# Patient Record
Sex: MALE | Race: BLACK OR AFRICAN AMERICAN | NOT HISPANIC OR LATINO | URBAN - METROPOLITAN AREA
[De-identification: names, ages, dates, MRNs, and addresses within clinical notes are randomized per-mention and may not be internally consistent; named-entity substitution may affect disease eponyms.]

---

## 2019-08-11 ENCOUNTER — EMERGENCY (EMERGENCY)
Facility: HOSPITAL | Age: 59
LOS: 1 days | Discharge: HOME | End: 2019-08-11
Attending: EMERGENCY MEDICINE | Admitting: EMERGENCY MEDICINE
Payer: COMMERCIAL

## 2019-08-11 VITALS
OXYGEN SATURATION: 100 % | DIASTOLIC BLOOD PRESSURE: 97 MMHG | SYSTOLIC BLOOD PRESSURE: 144 MMHG | TEMPERATURE: 98 F | HEART RATE: 74 BPM | RESPIRATION RATE: 16 BRPM

## 2019-08-11 VITALS
TEMPERATURE: 99 F | OXYGEN SATURATION: 100 % | HEART RATE: 66 BPM | RESPIRATION RATE: 16 BRPM | SYSTOLIC BLOOD PRESSURE: 162 MMHG | DIASTOLIC BLOOD PRESSURE: 93 MMHG

## 2019-08-11 PROCEDURE — 70486 CT MAXILLOFACIAL W/O DYE: CPT | Mod: 26

## 2019-08-11 PROCEDURE — 99284 EMERGENCY DEPT VISIT MOD MDM: CPT

## 2019-08-11 PROCEDURE — 72125 CT NECK SPINE W/O DYE: CPT | Mod: 26

## 2019-08-11 PROCEDURE — 70450 CT HEAD/BRAIN W/O DYE: CPT | Mod: 26

## 2019-08-11 RX ORDER — ACETAMINOPHEN 500 MG
650 TABLET ORAL ONCE
Refills: 0 | Status: COMPLETED | OUTPATIENT
Start: 2019-08-11 | End: 2019-08-11

## 2019-08-11 RX ADMIN — Medication 650 MILLIGRAM(S): at 07:22

## 2019-08-11 NOTE — ED PROVIDER NOTE - NS ED ROS FT
GENERAL: No fever or chills  EYES: burning sensation of eyes   HEENT: no trouble swallowing or speaking  CARDIAC: no chest pain or palpitations  PULMONARY: no cough or SOB  GI: no abdominal pain, nausea, vomiting, diarrhea, or constipation   : No changes in urination  SKIN: no rashes  NEURO: mild headache, no numbness, or weakness  MSK: No joint pain

## 2019-08-11 NOTE — ED PROVIDER NOTE - PROGRESS NOTE DETAILS
Ella: patient noted sleeping comfortably when attempting to evaluate. When awoken stating he is feeling okay with mild burning sensation to b/l eyes. pt fell asleep, more comfortable pending ct to be done. Also pending visual acuity and FL eyes evaluate for corneal abrasions. Endorse pt Dr Leone patient well appearing, CT negative. Patient awake stating symptoms improved.

## 2019-08-11 NOTE — ED PROVIDER NOTE - PHYSICAL EXAMINATION
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 no epistaxis. mmm. no lose teeth no lac on lips/tongue supple neck, nl rom. nontender mid cervical/thoracic or lumbar spine. no step off. no ecchymosis or bruising of chest/back or abdomen. soft nontender abdomen. no  rebound. no guarding. no sign of trauma. no CVAT stable pelvis. AOx3, no focal deficits. CN 2-12 grossly intact. nl gait. no meningeal signs. 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 no epistaxis. mmm. no lose teeth no lac on lips/tongue supple neck, nl rom. nontender mid cervical/thoracic or lumbar spine. no step off. no ecchymosis or bruising of chest/back or abdomen. soft nontender abdomen. no  rebound. no guarding. no sign of trauma. no CVAT stable pelvis. AOx3, no focal deficits. CN 2-12 grossly intact. nl gait. no meningeal signs.    Ella CORTEZ  GEN: NAD, mild ETOH intoxication   HEENT: injected erythematous conjunctiva bilaterally, 2mm b/l pupils reactive, EOM intact  CHEST/LUNGS: Non-tachypneic, CTAB, bilateral breath sounds  CARDIAC: Non-tachycardic, s1s2, normal perfusion, no peripheral edema  ABDOMEN: Soft, NTND, No rebound/guarding  MSK: No joint tenderness, no gross deformity of extremities  SKIN: slight abrasion to right lateral temple. 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 no epistaxis. mmm. no lose teeth no lac on lips/tongue supple neck, nl rom. nontender mid cervical/thoracic or lumbar spine. no step off. no ecchymosis or bruising of chest/back or abdomen. soft nontender abdomen. no  rebound. no guarding. no sign of trauma. no CVAT stable pelvis. AOx3, no focal deficits. CN 2-12 grossly intact. nl gait. no meningeal signs.    Ella CORTEZ  GEN: NAD, mild ETOH intoxication   HEENT: injected erythematous conjunctiva bilaterally, 2mm b/l pupils reactive, EOM intact, negative fluorescein staining for corneal abrasions   CHEST/LUNGS: Non-tachypneic, CTAB, bilateral breath sounds  CARDIAC: Non-tachycardic, s1s2, normal perfusion, no peripheral edema  ABDOMEN: Soft, NTND, No rebound/guarding  MSK: No joint tenderness, no gross deformity of extremities  SKIN: slight abrasion to right lateral temple. No rashes, no petechiae, no vesicles  NEURO: CN grossly intact, normal coordination, no focal motor or sensory deficits
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
## 2019-08-11 NOTE — ED ADULT NURSE REASSESSMENT NOTE - NS ED NURSE REASSESS COMMENT FT1
Pt AxOx4 at this time, verbalizing partial reduction in pain at this time. Pt states pain is 4/10 and tolerable, pt verbalizing partial improvement in eyesight. Pt denies CP, SOB. Breathing is even and unlabored, pt satting well on RA. Pt awaiting CT results. MD at bedside, will continue to monitor.

## 2020-04-21 NOTE — ED ADULT TRIAGE NOTE - NS ED NOTE AC HIGH RISK COUNTRIES
What Type Of Note Output Would You Prefer (Optional)?: Standard Output How Severe Is Your Skin Lesion?: mild Has Your Skin Lesion Been Treated?: not been treated Is This A New Presentation, Or A Follow-Up?: Skin Lesion Which Family Member (Optional)?: Father No